# Patient Record
Sex: MALE | Race: WHITE | HISPANIC OR LATINO | URBAN - NONMETROPOLITAN AREA
[De-identification: names, ages, dates, MRNs, and addresses within clinical notes are randomized per-mention and may not be internally consistent; named-entity substitution may affect disease eponyms.]

---

## 2020-11-30 ENCOUNTER — APPOINTMENT (RX ONLY)
Dept: URBAN - NONMETROPOLITAN AREA CLINIC 1 | Facility: CLINIC | Age: 25
Setting detail: DERMATOLOGY
End: 2020-11-30

## 2020-11-30 DIAGNOSIS — L40.0 PSORIASIS VULGARIS: ICD-10-CM | Status: UNCHANGED

## 2020-11-30 DIAGNOSIS — L30.9 DERMATITIS, UNSPECIFIED: ICD-10-CM | Status: WORSENING

## 2020-11-30 PROCEDURE — ? MEDICATION COUNSELING

## 2020-11-30 PROCEDURE — ? TREATMENT REGIMEN

## 2020-11-30 PROCEDURE — ? ADDITIONAL NOTES

## 2020-11-30 PROCEDURE — ? COUNSELING

## 2020-11-30 PROCEDURE — ? PRESCRIPTION

## 2020-11-30 PROCEDURE — 99203 OFFICE O/P NEW LOW 30 MIN: CPT

## 2020-11-30 RX ORDER — CLOBETASOL PROPIONATE 0.5 MG/ML
SOLUTION TOPICAL
Qty: 1 | Refills: 1 | Status: ERX | COMMUNITY
Start: 2020-11-30

## 2020-11-30 RX ORDER — MOMETASONE FUROATE 1 MG/G
OINTMENT TOPICAL
Qty: 1 | Refills: 1 | Status: ERX | COMMUNITY
Start: 2020-11-30

## 2020-11-30 RX ADMIN — CLOBETASOL PROPIONATE: 0.5 SOLUTION TOPICAL at 00:00

## 2020-11-30 RX ADMIN — MOMETASONE FUROATE: 1 OINTMENT TOPICAL at 00:00

## 2020-11-30 ASSESSMENT — BSA PSORIASIS: % BODY COVERED IN PSORIASIS: 5

## 2020-11-30 ASSESSMENT — LOCATION ZONE DERM
LOCATION ZONE: LEG
LOCATION ZONE: FACE

## 2020-11-30 ASSESSMENT — LOCATION SIMPLE DESCRIPTION DERM
LOCATION SIMPLE: RIGHT FOREHEAD
LOCATION SIMPLE: LEFT FOREHEAD
LOCATION SIMPLE: LEFT THIGH
LOCATION SIMPLE: RIGHT THIGH

## 2020-11-30 ASSESSMENT — LOCATION DETAILED DESCRIPTION DERM
LOCATION DETAILED: LEFT ANTERIOR PROXIMAL THIGH
LOCATION DETAILED: RIGHT ANTERIOR PROXIMAL THIGH
LOCATION DETAILED: LEFT FOREHEAD
LOCATION DETAILED: RIGHT FOREHEAD

## 2020-11-30 NOTE — PROCEDURE: ADDITIONAL NOTES
Detail Level: Simple
Additional Notes: Patient consent was obtained to proceed with the visit and recommended plan of care after discussion of all risks and benefits, including the risks of COVID-19 exposure.
Additional Notes: Patient reports daily joint pain in bilateral hands, knees, and feet
Detail Level: Detailed

## 2020-11-30 NOTE — HPI: RASH
What Type Of Note Output Would You Prefer (Optional)?: Bullet Format
How Severe Is Your Rash?: mild
Is This A New Presentation, Or A Follow-Up?: Rash
Additional History: Patient previously was on terbenafine for 6 weeks by his derm in [place] and it didn’t go away. He’s had this rash for 2 years.

## 2020-11-30 NOTE — PROCEDURE: TREATMENT REGIMEN
Other Instructions: Refer to Rheumatologist to r/o psoriatic arthritis in [place] Plan: Refer to Rheumatologist to r/o psoriatic arthritis in [place]

## 2020-11-30 NOTE — PROCEDURE: MEDICATION COUNSELING
Xelshreyasz Pregnancy And Lactation Text: This medication is Pregnancy Category D and is not considered safe during pregnancy.  The risk during breast feeding is also uncertain.

## 2021-04-01 ENCOUNTER — APPOINTMENT (RX ONLY)
Dept: URBAN - NONMETROPOLITAN AREA CLINIC 1 | Facility: CLINIC | Age: 26
Setting detail: DERMATOLOGY
End: 2021-04-01

## 2021-04-01 DIAGNOSIS — L40.0 PSORIASIS VULGARIS: ICD-10-CM | Status: STABLE

## 2021-04-01 PROCEDURE — ? COUNSELING

## 2021-04-01 PROCEDURE — ? FULL BODY SKIN EXAM - DECLINED

## 2021-04-01 PROCEDURE — ? PRESCRIPTION

## 2021-04-01 PROCEDURE — 99213 OFFICE O/P EST LOW 20 MIN: CPT

## 2021-04-01 PROCEDURE — ? TREATMENT REGIMEN

## 2021-04-01 PROCEDURE — ? ADDITIONAL NOTES

## 2021-04-01 RX ORDER — CLOBETASOL PROPIONATE 0.5 MG/ML
SOLUTION TOPICAL
Qty: 2 | Refills: 4 | Status: ERX

## 2021-04-01 ASSESSMENT — LOCATION SIMPLE DESCRIPTION DERM
LOCATION SIMPLE: SUPERIOR FOREHEAD
LOCATION SIMPLE: POSTERIOR SCALP
LOCATION SIMPLE: SCALP

## 2021-04-01 ASSESSMENT — LOCATION ZONE DERM
LOCATION ZONE: FACE
LOCATION ZONE: SCALP

## 2021-04-01 ASSESSMENT — LOCATION DETAILED DESCRIPTION DERM
LOCATION DETAILED: MID-OCCIPITAL SCALP
LOCATION DETAILED: RIGHT SUPERIOR FRONTAL SCALP
LOCATION DETAILED: SUPERIOR MID FOREHEAD

## 2021-04-01 ASSESSMENT — BSA PSORIASIS: % BODY COVERED IN PSORIASIS: 1

## 2021-04-01 ASSESSMENT — PGA PSORIASIS: PGA PSORIASIS 2020: ALMOST CLEAR

## 2021-04-07 ENCOUNTER — RX ONLY (OUTPATIENT)
Age: 26
Setting detail: RX ONLY
End: 2021-04-07

## 2021-04-07 RX ORDER — CLOBETASOL PROPIONATE 0.5 MG/G
OINTMENT TOPICAL
Qty: 3 | Refills: 0 | Status: ERX | COMMUNITY
Start: 2021-04-07

## 2021-04-08 ENCOUNTER — RX ONLY (OUTPATIENT)
Age: 26
Setting detail: RX ONLY
End: 2021-04-08

## 2021-04-08 RX ORDER — CLOBETASOL PROPIONATE 0.5 MG/G
OINTMENT TOPICAL
Qty: 3 | Refills: 0 | Status: ERX

## 2021-05-05 ENCOUNTER — TRANSCRIBE ORDER (OUTPATIENT)
Dept: REGISTRATION | Age: 26
End: 2021-05-05

## 2021-05-05 ENCOUNTER — HOSPITAL ENCOUNTER (OUTPATIENT)
Dept: GENERAL RADIOLOGY | Age: 26
Discharge: HOME | End: 2021-05-05
Payer: COMMERCIAL

## 2021-05-05 DIAGNOSIS — M06.4 INFLAMMATORY POLYARTHRITIS (HCC): Primary | ICD-10-CM

## 2021-05-05 DIAGNOSIS — M06.4 INFLAMMATORY POLYARTHRITIS (HCC): ICD-10-CM

## 2021-05-05 PROCEDURE — 73120 X-RAY EXAM OF HAND: CPT

## 2021-05-05 PROCEDURE — 73630 X-RAY EXAM OF FOOT: CPT

## 2022-08-22 ENCOUNTER — OFFICE VISIT (OUTPATIENT)
Dept: ORTHOPEDIC SURGERY | Age: 27
End: 2022-08-22
Payer: COMMERCIAL

## 2022-08-22 VITALS — HEIGHT: 69 IN | BODY MASS INDEX: 25.18 KG/M2 | WEIGHT: 170 LBS

## 2022-08-22 DIAGNOSIS — M77.8 TENDONITIS OF RIGHT HAND: Primary | ICD-10-CM

## 2022-08-22 PROCEDURE — 99204 OFFICE O/P NEW MOD 45 MIN: CPT | Performed by: ORTHOPAEDIC SURGERY

## 2022-08-22 RX ORDER — CETIRIZINE HYDROCHLORIDE 10 MG/1
TABLET ORAL
COMMUNITY
Start: 2022-07-14

## 2022-08-22 RX ORDER — FLUOCINONIDE 0.5 MG/G
OINTMENT TOPICAL 2 TIMES DAILY
COMMUNITY

## 2022-08-22 RX ORDER — MELOXICAM 15 MG/1
15 TABLET ORAL DAILY
Qty: 21 TABLET | Refills: 0 | Status: SHIPPED | OUTPATIENT
Start: 2022-08-22 | End: 2022-09-12

## 2022-08-23 NOTE — PROGRESS NOTES
Orthopaedic Hand Clinic Note    Name: Raisa Acosta YOB: 1995  Gender: male  MRN: [id number]      CC: Patient referred for evaluation of upper extremity pain    HPI: Raisa Acosta is a 32 y.o. male with a chief complaint of pain in the palm of the right hand which occurs if he stretches his middle and ring fingers out. He has no history of injury. 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 He has a history of psoriatic arthritis      ROS/Meds/PSH/PMH/FH/SH: I personally reviewed the patients standard intake form. Pertinents are discussed in the HPI    Physical Examination:    Musculoskeletal Exam:  Examination on the right upper extremity demonstrates cap refill < 5 seconds in all fingers, skin is intact. There is no swelling. He has mild tenderness to palpation at the midpalm. He has full finger flexion and extension. There is mild pain at the mid palm with full finger extension. There is no tenderness over any A1 pulley, and no triggering present. He has a negative tinel at the carpal tunnel, negative carpal tunnel compression and Phalens test. Light touch sensation is intact throughout. Imaging / Electrodiagnostic Tests:     none    Assessment:     ICD-10-CM    1. Tendonitis of right hand  M77.8 Ambulatory referral to Occupational Therapy     meloxicam (MOBIC) 15 MG tablet          Plan:   We discussed the diagnosis and different treatment options. We discussed observation, therapy, antiinflammatory medications and other pertinent treatment modalities. After discussing in detail the patient elects to proceed with prescription for Mobic, referral to hand therapy. Patient voiced accordance and understanding of the information provided and the formulated plan. All questions were answered to the patient's satisfaction during the encounter.       Flaco Hernandez MD  Orthopaedic Surgery  08/23/22  9:29 AM

## 2022-08-29 NOTE — PROGRESS NOTES
[address]  [address] [facility] [facility]  [address] [address]  Dept: [phone]      Occupational Therapy Initial Assessment     Referring MD: Nayeli Garrison MD    Diagnosis:     ICD-10-CM    1. Muscle weakness  M62.81       2. Pain of right hand  M79.641       3. Decreased activities of daily living (ADL)  Z78.9            Surgery/Medical Dx: Right Hand tendonitis      Therapy precautions: None    History of injury/onset : Per MD note:\" Lacie Nayak is a 32 y.o. male with a chief complaint of pain in the palm of the right hand which occurs if he stretches his middle and ring fingers out. He has no history of injury. 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 He has a history of psoriatic arthritis\"         Total Direct Treatment Time: 58 min  Total In Office Time: 65 min    Preferred Name: Otis Newell HISTORY     PMHX & Meds: History reviewed. No pertinent past medical history. , History reviewed. No pertinent surgical history. Pt has psoriatic arthritis which is involving numerous joints in UE and LE. Medications. 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 for IF, MF, and RF    Received previous outpatient therapy?   No      Pain Assessment:  Pain location: palm of right hand and into MF and RF  Average Pain/symptom intensity (0-10 scale)  Last 24 hours: 5/10  Last week (1-7 days): _7/10  How often do you feel symptoms? Constant (% of time)  Description: dull and sharp at times  Aggravating factors:  pressure on his hand ie weight lifting  Alleviating factors: heat, rest, avoid aggravating movements, massage, and not at work    Social/Functional Hx:  Pt lives lives with their family   Current DME: none  Work Status: Employed full time: [employer]  where he has to lifting, carrying, repetitive motions with his hand   Sleep: minimally disturbed  PLOF & Social Hx/Interests:  Independent and active without physical limitations and participated in working out and lifting weights  Current level of function:  Modifies lifting and carrying items and putting pressure on his hands    Neuro screen: Pt c/o and tingling in RF and MF    Patient Stated Goals: \"I would like for my hand to get back to normal and lift weights again\"    OBJECTIVE     Functional Outcome Measures: Quick Dash  32 score=   47 % functional deficit  Hand/Side Dominance: right handed  Observation/Posture:  no deficits noted  Palpation: Tenderness noted over palm of hand with more over DPC of IF, MF, and RF  Swelling/Edema:  Varies from none to mild in the palm of his hand  Skin Integrity: normal     A/PROM Measures:  Shoulder A/PROM RIGHT  LEFT     Shoulder screen [facility]                     Elbow  A/PROM RIGHT LEFT     Elbow/Forearm Screen [facility]  WF        Forearm/Wrist A/PROM RIGHT LEFT     Pronation/Supination [facility]     Wrist Extension/Flexion [facility]     RD/UD [facility]       Thumb A/PROM: RIGHT LEFT     Opposition (Kapandji): 10 10       Digital AROM: IF MF RF SF   Flexion to [facility] full full full        full       /Pinch Strength  Strength (psi): RIGHT  IE LEFT  IE      Position II 65 77     Lat Pinch: 20 18     2pt pinch: 10 8     3pt pinch: 13 12         Special Tests:  None performed   Evaluation Modifications/Assistance required : Verbal cues and Physical cues  Degree of assistance provided: minimal     Treatment:  Initial Evaluation: Low Complexity (65292)     Manual Therapy (32564) x 15  minutes: Addressing soft tissue/joint restrictions and swelling of  right hand:    STM to the palm and wrist area in preparation for PROM and tissue lengthening of the wrist/forearm components  K-tape application  to unload tendons in the right hand and to decrease pain. Placed middle finger through hole in k-tape with no stretch on dorsum of hand, 75% stretch over palm of hand and no stretch at wrist as anchor. K-tape placed with wrist and fingers extended. Ultrasound (75419) x 8 minutes to palmar surface of right hand, at 3 MHz, .8 w/cm2  continuous with ketoprofen-Lidocaine cream Lot # [id number])  assisting in reducing pain, muscle spasm/soft tissue restrictions. Therapeutic exercise (74032) x 15 min:  Home Exercise Program development and Education: see below. Patient I with program following instruction and performance  Use of heat and ice as needed for pain and inflammation reduction. Precautions reviewed. OT POC and rationale, education for pain management, edema management  [facility] Code: [email or web address]  URL: [email or web address]  Date: 08/30/2022  Prepared by: Pilar Pen    Exercises  Median Nerve Flossing - Tray - 3-5 x weekly - 1-3 sets - 10-15 reps  Ulnar Nerve Flossing - 3-5 x weekly - 1-3 sets - 10-15 reps  Seated Finger MP Extension PROM - 3-5 x weekly - 1-3 sets - 10-15 reps        CLINICAL DECISION MAKING/ASSESSMENT     Performance Deficits  Physical: Mobility and Strength  Cognitive: No deficiencies noted  Psychosocial: No deficiencies noted  Rehab potential: good    The patient presents today s/p hand tendonitis. The patient presents with pain, varied edema, decreased  strength and difficulty with ADL's and recreational activities. These deficits appear to be secondary to tendonitis in right hand .   Based on these subjective and objective findings, the patient is perceived as currently having a primary functional deficit of  47% dysfunction. After a brief chart/PMH and OT profile review, evaluation requiring minimal  assistance/modifications and simple patient and data analysis, I have determined the patient exhibits several (1-3) performance deficits. Comorbidities may affect the patient's occupational performance. The following performance deficits (including but not limited to physical, cognitive and/or psychosocial components) result in activity limitations and participation restrictions: Strength    The patient would benefit from skilled occupational therapy services to address the deficits noted above for return to prior level of function. PLAN OF CARE     Effective Dates: 8/30/2022 TO 10/11/2022  (42 days). Frequency/Duration: 1-2x/week for 42 Day(s)  Interventions may include but are not limited to: (17128) Therapeutic exercise to develop strength and endurance, range of motion, and flexibility, (05619) Manual Therapy:   Consisting of but not limited to hands on treatment by therapist for connective tissue massage, pain management, edema management, joint mobilization and manipulation, manual traction, passive range of motion, soft tissue and neural system mobilization/manipulation and therapeutic massage., (30108) Therapeutic activities:Direct one on one patient contact with provider, use of dynamic activities to improve functional performance, Modalities prn to address pain, spasms, and swelling: (21175) Ultrasound/phonophoresis  (57228) Hot/cold pack  (64395) Fluidotherapy  (65324) Paraffin, Home exercise program (HEP) development, (00718) Kineseotaping for Therapeutic Procedure/Exercise  for strengthening or lengthening a muscle.  Used in conjunction with retraining posture, endurance and flexibility, and Dry Needling:  Stimulating underlying myofascial trigger points, muscular and connective tissues for the management of neuromusculoskeletal pain and movement impairment       GOALS   Short Term Goals:  10/11/2022  (6 weeks)  Patient will be I with HEP and precautions. Patient will have no visible edema in affected hand. Patient will decrease pain so that pt is able to open a jar with minimal difficulty. Patient will be independent with K-tape application to decrease pain and offload tendons in right hand. Decrease Quick Dash to less than 20% to demonstrate improved function.     [facility] Portal     OT Protocols

## 2022-08-30 ENCOUNTER — OFFICE VISIT (OUTPATIENT)
Dept: ORTHOPEDIC SURGERY | Age: 27
End: 2022-08-30
Payer: COMMERCIAL

## 2022-08-30 DIAGNOSIS — M79.641 PAIN OF RIGHT HAND: ICD-10-CM

## 2022-08-30 DIAGNOSIS — Z78.9 DECREASED ACTIVITIES OF DAILY LIVING (ADL): ICD-10-CM

## 2022-08-30 DIAGNOSIS — M62.81 MUSCLE WEAKNESS: Primary | ICD-10-CM

## 2022-08-30 PROCEDURE — 97110 THERAPEUTIC EXERCISES: CPT | Performed by: OCCUPATIONAL THERAPIST

## 2022-08-30 PROCEDURE — 97140 MANUAL THERAPY 1/> REGIONS: CPT | Performed by: OCCUPATIONAL THERAPIST

## 2022-08-30 PROCEDURE — 97035 APP MDLTY 1+ULTRASOUND EA 15: CPT | Performed by: OCCUPATIONAL THERAPIST

## 2022-08-30 PROCEDURE — 97165 OT EVAL LOW COMPLEX 30 MIN: CPT | Performed by: OCCUPATIONAL THERAPIST

## 2022-09-01 ENCOUNTER — OFFICE VISIT (OUTPATIENT)
Dept: ORTHOPEDIC SURGERY | Age: 27
End: 2022-09-01
Payer: COMMERCIAL

## 2022-09-01 DIAGNOSIS — M62.81 MUSCLE WEAKNESS: Primary | ICD-10-CM

## 2022-09-01 DIAGNOSIS — M77.8 TENDONITIS OF RIGHT HAND: ICD-10-CM

## 2022-09-01 DIAGNOSIS — M79.641 PAIN OF RIGHT HAND: ICD-10-CM

## 2022-09-01 DIAGNOSIS — Z78.9 DECREASED ACTIVITIES OF DAILY LIVING (ADL): ICD-10-CM

## 2022-09-01 PROCEDURE — 97140 MANUAL THERAPY 1/> REGIONS: CPT | Performed by: OCCUPATIONAL THERAPIST

## 2022-09-01 PROCEDURE — 97035 APP MDLTY 1+ULTRASOUND EA 15: CPT | Performed by: OCCUPATIONAL THERAPIST

## 2022-09-01 NOTE — PROGRESS NOTES
[address]  [address]  [place]  Dept: [phone]      Occupational Therapy Daily Note     Referring MD: Gregg Hardy MD    Diagnosis:     ICD-10-CM    1. Muscle weakness  M62.81       2. Pain of right hand  M79.641       3. Decreased activities of daily living (ADL)  Z78.9       4. Tendonitis of right hand  M77.8            Surgery/Medical Dx: Right Hand tendonitis      Therapy precautions: None    History of injury/onset : Per MD note: Abbie Acosta. is a 32 y.o. male with a chief complaint of pain in the palm of the right hand which occurs if he stretches his middle and ring fingers out. He has no history of injury. 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 He has a history of psoriatic arthritis. Total Direct Treatment Time: 28 min  Total In Office Time: 30 min    Preferred Name: Xin Mitchell HISTORY     PMHX & Meds: History reviewed. No pertinent past medical history. , History reviewed. No pertinent surgical history. Pt has psoriatic arthritis which is involving numerous joints in UE and LE.   Medications. : Reviewed in chart  Allergies: No Known Allergies     SUBJECTIVE     Current Symptoms/Chief complaints:   Chief Complaint   Patient presents with    Hand Pain     Patient Stated Goals: \"I would like for my hand to get back to normal and lift weights again\"    OBJECTIVE         A/PROM Measures:  Shoulder A/PROM RIGHT  LEFT     Shoulder screen [facility]                     Elbow  A/PROM RIGHT LEFT     Elbow/Forearm Screen [facility]  WFL        Forearm/Wrist A/PROM RIGHT LEFT     Pronation/Supination [facility] WFL     Wrist Extension/Flexion [facility] WFL     RD/UD WFL WFL       Thumb A/PROM: RIGHT LEFT     Opposition (Nohemy): 10 10       Digital AROM: IF MF RF SF   Flexion to [facility] full full full        full       /Pinch Strength  Strength (psi): RIGHT  IE LEFT  IE      Position II 65 77     Lat Pinch: 20 18     2pt pinch: 10 8     3pt pinch: 13 12           Treatment:    Manual Therapy (60213) x 20  minutes: Addressing soft tissue/joint restrictions and swelling of  right hand:    STM to the palm and wrist area   K-tape application  to unload tendons in the right hand and to decrease pain. Placed ring and middle finger through hole in k-tape with no stretch on dorsum of hand, 75% stretch over palm of hand and no stretch at wrist as anchor. K-tape placed with wrist and fingers extended. Ultrasound (97548) x 8 minutes to palmar surface of right hand, at 3 MHz, .8 w/cm2  continuous with ketoprofen-Lidocaine cream Lot # [id number])  assisting in reducing pain, muscle spasm/soft tissue restrictions. ASSESSMENT     Tenderness with palpation at the carpometacarpal joints of the RF and MF    PLAN OF CARE     Decrease pain in right hand    GOALS   Short Term Goals:  10/11/2022  (6 weeks)  Patient will be I with HEP and precautions. Patient will have no visible edema in affected hand. Patient will decrease pain so that pt is able to open a jar with minimal difficulty. Patient will be independent with K-tape application to decrease pain and offload tendons in right hand. Decrease Quick Dash to less than 20% to demonstrate improved function.     [employer] Portal     OT Protocols

## 2022-09-02 NOTE — PROGRESS NOTES
[address]  [address]  [place]  Dept: [phone]      Occupational Therapy Daily Note     Referring MD: Cayden Sheppard MD    Diagnosis:     ICD-10-CM    1. Muscle weakness  M62.81       2. Pain of right hand  M79.641       3. Decreased activities of daily living (ADL)  Z78.9              Surgery/Medical Dx: Right Hand tendonitis      Therapy precautions: None    History of injury/onset : Per MD note: Stephanie Escalante is a 32 y.o. male with a chief complaint of pain in the palm of the right hand which occurs if he stretches his middle and ring fingers out. He has no history of injury. 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 He has a history of psoriatic arthritis. Total Direct Treatment Time: 28 min  Total In Office Time: 45 min    Preferred Name: Florina Woodruff   Pt reporting that his pain is continuing. He is having to use it for work. Current Symptoms/Chief complaints:   No chief complaint on file. OBJECTIVE     /Pinch Strength  Strength (psi): RIGHT  IE LEFT  IE      Position II 65 77     Lat Pinch: 20 18     2pt pinch: 10 8     3pt pinch: 13 12           Treatment:  Fluidotherapy (60058)  x 15 minutes to right hand/wrist for desensitization and preparation for treatment. Manual Therapy (72685) x 20  minutes: Addressing soft tissue/joint restrictions and swelling of  right hand:    STM / IASTM to the palm and wrist area   K-tape application  to unload tendons in the right hand and to decrease pain. Placed indexand middle finger through hole in k-tape with no stretch on dorsum of hand, 75% stretch over palm of hand and no stretch at wrist as anchor. K-tape placed with wrist and fingers extended.         Ultrasound (04006) x 8 minutes to palmar surface of right hand, at 3 MHz, .8 w/cm2  continuous with ketoprofen-Lidocaine cream Lot

## 2022-09-06 ENCOUNTER — OFFICE VISIT (OUTPATIENT)
Dept: ORTHOPEDIC SURGERY | Age: 27
End: 2022-09-06
Payer: COMMERCIAL

## 2022-09-06 DIAGNOSIS — M79.641 PAIN OF RIGHT HAND: ICD-10-CM

## 2022-09-06 DIAGNOSIS — Z78.9 DECREASED ACTIVITIES OF DAILY LIVING (ADL): ICD-10-CM

## 2022-09-06 DIAGNOSIS — M62.81 MUSCLE WEAKNESS: Primary | ICD-10-CM

## 2022-09-06 PROCEDURE — 97035 APP MDLTY 1+ULTRASOUND EA 15: CPT | Performed by: OCCUPATIONAL THERAPIST

## 2022-09-06 PROCEDURE — 97140 MANUAL THERAPY 1/> REGIONS: CPT | Performed by: OCCUPATIONAL THERAPIST

## 2022-09-06 PROCEDURE — 97022 WHIRLPOOL THERAPY: CPT | Performed by: OCCUPATIONAL THERAPIST

## 2022-09-08 ENCOUNTER — OFFICE VISIT (OUTPATIENT)
Dept: ORTHOPEDIC SURGERY | Age: 27
End: 2022-09-08
Payer: COMMERCIAL

## 2022-09-08 DIAGNOSIS — M79.641 PAIN OF RIGHT HAND: ICD-10-CM

## 2022-09-08 DIAGNOSIS — Z78.9 DECREASED ACTIVITIES OF DAILY LIVING (ADL): ICD-10-CM

## 2022-09-08 DIAGNOSIS — M62.81 MUSCLE WEAKNESS: Primary | ICD-10-CM

## 2022-09-08 DIAGNOSIS — M77.8 TENDONITIS OF RIGHT HAND: ICD-10-CM

## 2022-09-08 PROCEDURE — 97110 THERAPEUTIC EXERCISES: CPT

## 2022-09-08 PROCEDURE — 97035 APP MDLTY 1+ULTRASOUND EA 15: CPT

## 2022-09-08 PROCEDURE — 97140 MANUAL THERAPY 1/> REGIONS: CPT

## 2022-09-08 PROCEDURE — 97022 WHIRLPOOL THERAPY: CPT

## 2022-09-08 NOTE — PROGRESS NOTES
[address]  [address]  [facility]  Dept: [phone]      Occupational Therapy Daily Note     Referring MD: Ramon Leo MD    Diagnosis:     ICD-10-CM    1. Muscle weakness  M62.81       2. Decreased activities of daily living (ADL)  Z78.9       3. Tendonitis of right hand  M77.8       4. Pain of right hand  M79.641                Surgery/Medical Dx: Right Hand tendonitis      Therapy precautions: None    History of injury/onset : Per MD note: Val Gaspar is a 32 y.o. male with a chief complaint of pain in the palm of the right hand which occurs if he stretches his middle and ring fingers out. He has no history of injury. 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 He has a history of psoriatic arthritis. Total Direct Treatment Time: 38 min  Total In Office Time: 53 min    Preferred Name: Ike Roman   Pt reporting that his pain is continuing. He is having to use it for work. Current Symptoms/Chief complaints:   Chief Complaint   Patient presents with    Hand Pain         OBJECTIVE     /Pinch Strength  Strength (psi): RIGHT  IE LEFT  IE      Position II 65 77     Lat Pinch: 20 18     2pt pinch: 10 8     3pt pinch: 13 12           Treatment:  Fluidotherapy (63593)  x 15 minutes to right hand/wrist for desensitization and preparation for treatment. Manual Therapy (28673) x 15 minutes: Addressing soft tissue/joint restrictions and swelling of  right hand:    STM / IASTM to the palm and wrist area   K-tape application  to unload tendons in the right hand and to decrease pain. Placed indexand middle finger through hole in k-tape with no stretch on dorsum of hand, 75% stretch over palm of hand and no stretch at wrist as anchor. K-tape placed with wrist and fingers extended.         Ultrasound (25299) x 8 minutes to palmar surface of right hand, at 3 MHz, .8 w/cm2  continuous with ketoprofen-Lidocaine cream Lot # [id number])  assisting in reducing pain, muscle spasm/soft tissue restrictions. Therapeutic exercise (81161) x 15 min:  PROM/Stretch lumbricals  Long flexor stretch  Active lumbrical stretch  Web space stretching of all digits  Instructed on lumbrical stretching at home              ASSESSMENT     Continues to present with pain and tenderness continues with palpation between IF and MF MC. Patient with lumbrical tigthness. Incorporated lumbrical stretching into program.   PLAN OF CARE   Continue with therapy to decrease pain and increase functional use/strength of right hand. GOALS   Short Term Goals:  10/11/2022  (6 weeks)  Patient will be I with HEP and precautions. Patient will have no visible edema in affected hand. Patient will decrease pain so that pt is able to open a jar with minimal difficulty. Patient will be independent with K-tape application to decrease pain and offload tendons in right hand. Decrease Quick Dash to less than 20% to demonstrate improved function.     [facility] Portal     OT Protocols

## 2022-09-21 ENCOUNTER — OFFICE VISIT (OUTPATIENT)
Dept: ORTHOPEDIC SURGERY | Age: 27
End: 2022-09-21
Payer: COMMERCIAL

## 2022-09-21 DIAGNOSIS — M62.81 MUSCLE WEAKNESS: Primary | ICD-10-CM

## 2022-09-21 DIAGNOSIS — M77.8 TENDONITIS OF RIGHT HAND: ICD-10-CM

## 2022-09-21 DIAGNOSIS — Z78.9 DECREASED ACTIVITIES OF DAILY LIVING (ADL): ICD-10-CM

## 2022-09-21 DIAGNOSIS — M79.641 PAIN OF RIGHT HAND: ICD-10-CM

## 2022-09-21 PROCEDURE — 97032 APPL MODALITY 1+ESTIM EA 15: CPT | Performed by: OCCUPATIONAL THERAPIST

## 2022-09-21 PROCEDURE — 97110 THERAPEUTIC EXERCISES: CPT | Performed by: OCCUPATIONAL THERAPIST

## 2022-09-21 PROCEDURE — 97140 MANUAL THERAPY 1/> REGIONS: CPT | Performed by: OCCUPATIONAL THERAPIST

## 2022-09-21 PROCEDURE — 97035 APP MDLTY 1+ULTRASOUND EA 15: CPT | Performed by: OCCUPATIONAL THERAPIST

## 2022-09-21 NOTE — PROGRESS NOTES
[address]  [address]  [place]  Dept: [phone]      Occupational Therapy Daily Note     Referring MD: Zoila Geiger MD    Diagnosis:     ICD-10-CM    1. Muscle weakness  M62.81       2. Decreased activities of daily living (ADL)  Z78.9       3. Tendonitis of right hand  M77.8       4. Pain of right hand  M79.641            Surgery/Medical Dx: Right Hand tendonitis      Therapy precautions: None    History of injury/onset : Per MD note: Dalila Klinefelter. is a 32 y.o. male with a chief complaint of pain in the palm of the right hand which occurs if he stretches his middle and ring fingers out. He has no history of injury. 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 He has a history of psoriatic arthritis. Total Direct Treatment Time: 60 min  Total In Office Time: 65 min    Preferred Name: Coleen Leggett     Pt reports continued pain and discomfort in the palm of his hand. 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 Current Symptoms/Chief complaints:   No chief complaint on file. OBJECTIVE     /Pinch Strength  Strength (psi): RIGHT  IE LEFT  IE      Position II 65 77     Lat Pinch: 20 18     2pt pinch: 10 8     3pt pinch: 13 12         9/21/22: pain more with palmar pinch and repetitive movements (less pain with composite fist)   Treatment:    Electrical Stimulation x10 minutes (IFC/TENs setting) to palmar area of right hand, 5mA; added heat pack with TENs at end of session to reduce pain     Manual Therapy (94127) x 15 minutes:  Addressing soft tissue/joint restrictions and swelling of  right hand:    STM / IASTM to the palm and wrist area     Ultrasound (63901) x 10 minutes to palmar surface of right hand, at 3 MHz, .8 w/cm2  continuous with ketoprofen-Lidocaine cream Lot # [id number])  assisting in reducing pain, muscle spasm/soft tissue restrictions. Therapeutic exercise (73217) x 25 min:  PROM/Stretch lumbricals/intrinsic   Long flexor stretch   Active lumbrical stretch  Finger extensor strengthening with yellow rubber band to strengthen opposing muscles   Instructed on lumbrical stretching at home (continued)   Towel crawls (light) without force to help with tendon gliding   Education on maintaining  strength (blue foam block give to work on composite fist as well as finger adduction for intrinsic strengthening)             ASSESSMENT     Continued pain and tenderness in IF/MF flexor tendon and lumbricals. Pain increases after hand use (gripping, twisting, pinching). Minimal response to all modalities and therapeutic exercise but will continue to be consistent with modalities in order to reduce pain. Pt may benefit from special glove insert/wrap with silicone to absorb force during  work. PLAN OF CARE   Continue with therapy to decrease pain and increase functional use/strength of right hand. Modalities  Taping  Gentle  strengthening   Median nerve gliding   Ultrasound       GOALS   Short Term Goals:  10/11/2022  (6 weeks)  Patient will be I with HEP and precautions. Patient will have no visible edema in affected hand. Patient will decrease pain so that pt is able to open a jar with minimal difficulty. Patient will be independent with K-tape application to decrease pain and offload tendons in right hand. Decrease Quick Dash to less than 20% to demonstrate improved function.     [facility] Portal     OT Protocols

## 2022-09-26 ENCOUNTER — OFFICE VISIT (OUTPATIENT)
Dept: ORTHOPEDIC SURGERY | Age: 27
End: 2022-09-26
Payer: COMMERCIAL

## 2022-09-26 DIAGNOSIS — M79.641 PAIN OF RIGHT HAND: ICD-10-CM

## 2022-09-26 DIAGNOSIS — M77.8 TENDONITIS OF RIGHT HAND: ICD-10-CM

## 2022-09-26 DIAGNOSIS — Z78.9 DECREASED ACTIVITIES OF DAILY LIVING (ADL): ICD-10-CM

## 2022-09-26 DIAGNOSIS — M62.81 MUSCLE WEAKNESS: Primary | ICD-10-CM

## 2022-09-26 PROCEDURE — 97035 APP MDLTY 1+ULTRASOUND EA 15: CPT | Performed by: OCCUPATIONAL THERAPIST

## 2022-09-26 PROCEDURE — 97110 THERAPEUTIC EXERCISES: CPT | Performed by: OCCUPATIONAL THERAPIST

## 2022-09-26 NOTE — PROGRESS NOTES
[address]  [address] [facility]  [place]  Dept: [phone]      Occupational Therapy Daily Note     Referring MD: Martin Gates MD    Diagnosis:     ICD-10-CM    1. Muscle weakness  M62.81       2. Decreased activities of daily living (ADL)  Z78.9       3. Tendonitis of right hand  M77.8       4. Pain of right hand  M79.641            Surgery/Medical Dx: Right Hand tendonitis      Therapy precautions: None    History of injury/onset : Per MD note: Doris Thomas is a 32 y.o. male with a chief complaint of pain in the palm of the right hand which occurs if he stretches his middle and ring fingers out. He has no history of injury. 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 He has a history of psoriatic arthritis. Total Direct Treatment Time: 45 min  Total In Office Time: 50 min    Preferred Name: Adam Bueno   Pt reports he is still having a lot of pain in his palm, specifically in between index and middle finger in lumbrical region. Current Symptoms/Chief complaints:   No chief complaint on file. OBJECTIVE     /Pinch Strength  Strength (psi): RIGHT  IE LEFT  IE      Position II 65 77     Lat Pinch: 20 18     2pt pinch: 10 8     3pt pinch: 13 12         9/21/22: pain more with palmar pinch and repetitive movements (less pain with composite fist)   Treatment:        Ultrasound (65183) x 10 minutes to palmar surface of right hand, at 3 MHz, .8 w/cm2  continuous with ketoprofen-Lidocaine cream Lot # [id number])  assisting in reducing pain, muscle spasm/soft tissue restrictions.          Therapeutic exercise (12511) x 30 min:  PROM/Stretch lumbricals/intrinsic   Long flexor stretch   Yellow theraputty: palmar rolling, oppositional pinching,  and pull, IF/MF and MF/RF intrinsic pinch and pull   EDC strengthening exercise with small dowel and velcro board  Red theraband flexbar: sup/pro and wrist F/E 2x20  Finger extensor strengthening with yellow rubber band to strengthen opposing muscles   Instructed on lumbrical stretching at home (continued)   Education on maintaining  strength (blue foam block give to work on composite fist as well as finger adduction for intrinsic strengthening)   KT tape applied to palmar area at base of index and middle finger to take tension off flexor tendons       ASSESSMENT     Pt continues to have pain in his palm with pinching. We focused more on strengthening wrist and fingers today. PLAN OF CARE   Continue with therapy to decrease pain and increase functional use/strength of right hand. Modalities  Taping  Gentle  strengthening, finger and wrist strengthening   Median nerve gliding   Ultrasound       GOALS   Short Term Goals:  10/11/2022  (6 weeks)  Patient will be I with HEP and precautions. Patient will have no visible edema in affected hand. Patient will decrease pain so that pt is able to open a jar with minimal difficulty. Patient will be independent with K-tape application to decrease pain and offload tendons in right hand. Decrease Quick Dash to less than 20% to demonstrate improved function.     [employer] Portal     OT Protocols

## 2022-09-29 ENCOUNTER — OFFICE VISIT (OUTPATIENT)
Dept: ORTHOPEDIC SURGERY | Age: 27
End: 2022-09-29
Payer: COMMERCIAL

## 2022-09-29 DIAGNOSIS — Z78.9 DECREASED ACTIVITIES OF DAILY LIVING (ADL): ICD-10-CM

## 2022-09-29 DIAGNOSIS — M79.641 PAIN OF RIGHT HAND: ICD-10-CM

## 2022-09-29 DIAGNOSIS — M62.81 MUSCLE WEAKNESS: Primary | ICD-10-CM

## 2022-09-29 PROCEDURE — 97110 THERAPEUTIC EXERCISES: CPT | Performed by: OCCUPATIONAL THERAPIST

## 2022-09-29 PROCEDURE — 97018 PARAFFIN BATH THERAPY: CPT | Performed by: OCCUPATIONAL THERAPIST

## 2022-09-29 NOTE — PROGRESS NOTES
[address]  [address] [facility] [address]  [place]  Dept: [phone]      Occupational Therapy Daily Note     Referring MD: Zoila Geiger MD    Diagnosis:     ICD-10-CM    1. Muscle weakness  M62.81       2. Decreased activities of daily living (ADL)  Z78.9       3. Pain of right hand  M79.641            Surgery/Medical Dx: Right Hand tendonitis      Therapy precautions: None    History of injury/onset : Per MD note: Dalila Klinefelter. is a 32 y.o. male with a chief complaint of pain in the palm of the right hand which occurs if he stretches his middle and ring fingers out. He has no history of injury. 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 He has a history of psoriatic arthritis. Total Direct Treatment Time: 30 min  Total In Office Time: 45 min    Preferred Name: Coleen Leggett   Pt reports he thinks his hand is getting worse. Current Symptoms/Chief complaints:   Chief Complaint   Patient presents with    Hand Pain       OBJECTIVE     /Pinch Strength  Strength (psi): RIGHT  IE LEFT  IE  LEFT  9/29/22    Position II 65 77  72   Lat Pinch: 20 18     2pt pinch: 10 8     3pt pinch: 13 12         9/21/22: pain more with palmar pinch and repetitive movements (less pain with composite fist)   Treatment:      Manual Therapy (86547) x 5 minutes: Addressing soft tissue/joint restrictions and swelling of  right hand:    STM  to the palm and wrist area        Paraffin to  right hand, (91784) x 10 minutes, to increase tissue extensibility/decrease pain  to prepare for treatment.       Therapeutic exercise (33688) x 30 min:  PROM/Stretch lumbricals/intrinsic   Long flexor stretch   Yellow theraputty: palmar rolling, oppositional pinching,  and pull, IF/MF and MF/RF intrinsic pinch and pull   EDC strengthening exercise with small dowel and velcro board  Red theraband flexbar: sup/pro and wrist F/E 2x20  Finger extensor strengthening with yellow rubber band to strengthen opposing muscles         ASSESSMENT     Pt feels his hand is worsening. Long discussion with pt regarding lack of progress despite trying various treatment options. Discussed pt status with MD. She recommended a cortisone injection. Pt  agreeable and is scheduled. PLAN OF CARE   Hold therapy for now. Follow up with pt after injection. GOALS   Short Term Goals:  10/11/2022  (6 weeks)  Patient will be I with HEP and precautions. Patient will have no visible edema in affected hand. Patient will decrease pain so that pt is able to open a jar with minimal difficulty. Patient will be independent with K-tape application to decrease pain and offload tendons in right hand. Decrease Quick Dash to less than 20% to demonstrate improved function.     [employer] Portal     OT Protocols

## 2022-10-06 ENCOUNTER — OFFICE VISIT (OUTPATIENT)
Dept: ORTHOPEDIC SURGERY | Age: 27
End: 2022-10-06
Payer: COMMERCIAL

## 2022-10-06 VITALS — HEIGHT: 69 IN | BODY MASS INDEX: 25.18 KG/M2 | WEIGHT: 170 LBS

## 2022-10-06 DIAGNOSIS — M79.641 RIGHT HAND PAIN: Primary | ICD-10-CM

## 2022-10-06 PROCEDURE — 99213 OFFICE O/P EST LOW 20 MIN: CPT | Performed by: ORTHOPAEDIC SURGERY

## 2022-10-06 NOTE — PROGRESS NOTES
Orthopaedic Hand Clinic Note    Name: Delmi Cota YOB: 1995  Gender: male  MRN: [id number]      Follow up visit:   1. Right hand pain        HPI: Delmi Cota is a 32 y.o. male who is following up for pain in the palm of the right hand. He denies numbness. He says pain is sharp and sometimes electrical. He has been doing hand therapy but thinks it is making him worse. ROS/Meds/PSH/PMH/FH/SH: I personally reviewed the patients standard intake form. Pertinents are discussed in the HPI    Physical Examination:    Musculoskeletal Examination:  Examination on the right upper extremity demonstrates cap refill < 5 seconds in all fingers, skin is intact. There is no swelling. His point of maximal tenderness is between the 2nd and 3rd metacarpals at the level of the distal palmar crease. There is no tenderness over the index or middle A1 pulleys and no locking present. There are no palpable masses. Equivocal tinel's over the site of maximal tenderness    Imaging / Electrodiagnostic Tests:     none    Assessment:     ICD-10-CM    1. Right hand pain  M79.641 MRI HAND RIGHT WO CONTRAST          Plan:   We discussed the diagnosis and different treatment options. We discussed observation, therapy, antiinflammatory medications and other pertinent treatment modalities. After discussing in detail the patient elects to proceed with MRI of the right hand to further evaluate for any lesion which may be resulting in his pain, such as a peripheral nerve sheath tumor, as he does complain of an electrical like sensation. Once his MRI is complete he will return and we will discuss the results and further treatment options    Patient voiced accordance and understanding of the information provided and the formulated plan. All questions were answered to the patient's satisfaction during the encounter.       Tanja Rocha MD  Orthopaedic Surgery  10/06/22  6:14 PM

## 2022-10-07 ENCOUNTER — CLINICAL DOCUMENTATION (OUTPATIENT)
Dept: ORTHOPEDIC SURGERY | Age: 27
End: 2022-10-07

## 2023-08-23 NOTE — PROCEDURE: MEDICATION COUNSELING
Topical Retinoid Pregnancy And Lactation Text: This medication is Pregnancy Category C. It is unknown if this medication is excreted in breast milk. Erythromycin Pregnancy And Lactation Text: This medication is Pregnancy Category B and is considered safe during pregnancy. It is also excreted in breast milk.

## 2023-10-12 NOTE — PROCEDURE: MEDICATION COUNSELING
Estimated Blood Loss (Cc): minimal Sarecycline Counseling: Patient advised regarding possible photosensitivity and discoloration of the teeth, skin, lips, tongue and gums.  Patient instructed to avoid sunlight, if possible.  When exposed to sunlight, patients should wear protective clothing, sunglasses, and sunscreen.  The patient was instructed to call the office immediately if the following severe adverse effects occur:  hearing changes, easy bruising/bleeding, severe headache, or vision changes.  The patient verbalized understanding of the proper use and possible adverse effects of sarecycline.  All of the patient's questions and concerns were addressed.

## 2023-10-31 NOTE — PROCEDURE: MEDICATION COUNSELING
Detail Level: Zone Solaraze Pregnancy And Lactation Text: This medication is Pregnancy Category B and is considered safe. There is some data to suggest avoiding during the third trimester. It is unknown if this medication is excreted in breast milk.

## 2024-03-05 ENCOUNTER — HOSPITAL ENCOUNTER (OUTPATIENT)
Dept: GENERAL RADIOLOGY | Age: 29
Discharge: HOME | End: 2024-03-08

## 2024-03-05 DIAGNOSIS — R52 PAIN: ICD-10-CM
